# Patient Record
Sex: MALE | Race: BLACK OR AFRICAN AMERICAN | NOT HISPANIC OR LATINO | Employment: UNEMPLOYED | URBAN - METROPOLITAN AREA
[De-identification: names, ages, dates, MRNs, and addresses within clinical notes are randomized per-mention and may not be internally consistent; named-entity substitution may affect disease eponyms.]

---

## 2019-01-31 ENCOUNTER — HOSPITAL ENCOUNTER (EMERGENCY)
Facility: HOSPITAL | Age: 24
Discharge: HOME | End: 2019-01-31
Attending: EMERGENCY MEDICINE
Payer: MEDICAID

## 2019-01-31 VITALS
SYSTOLIC BLOOD PRESSURE: 137 MMHG | OXYGEN SATURATION: 99 % | RESPIRATION RATE: 16 BRPM | WEIGHT: 175 LBS | TEMPERATURE: 98 F | HEIGHT: 68 IN | BODY MASS INDEX: 26.52 KG/M2 | DIASTOLIC BLOOD PRESSURE: 63 MMHG | HEART RATE: 61 BPM

## 2019-01-31 DIAGNOSIS — R05.8 PAINFUL COUGH: ICD-10-CM

## 2019-01-31 DIAGNOSIS — R52 PAINFUL COUGH: ICD-10-CM

## 2019-01-31 DIAGNOSIS — M94.0 COSTOCHONDRITIS: ICD-10-CM

## 2019-01-31 DIAGNOSIS — J02.9 SORE THROAT: ICD-10-CM

## 2019-01-31 DIAGNOSIS — J06.9 URI WITH COUGH AND CONGESTION: Primary | ICD-10-CM

## 2019-01-31 LAB — DEPRECATED S PYO AG THROAT QL EIA: NEGATIVE

## 2019-01-31 PROCEDURE — 87880 STREP A ASSAY W/OPTIC: CPT

## 2019-01-31 PROCEDURE — 87081 CULTURE SCREEN ONLY: CPT

## 2019-01-31 PROCEDURE — 99284 PR EMERGENCY DEPT VISIT,LEVEL IV: ICD-10-PCS | Mod: ,,, | Performed by: PHYSICIAN ASSISTANT

## 2019-01-31 PROCEDURE — 99284 EMERGENCY DEPT VISIT MOD MDM: CPT | Mod: 25

## 2019-01-31 PROCEDURE — 99284 EMERGENCY DEPT VISIT MOD MDM: CPT | Mod: ,,, | Performed by: PHYSICIAN ASSISTANT

## 2019-01-31 RX ORDER — METHYLPREDNISOLONE 4 MG/1
TABLET ORAL
Qty: 1 PACKAGE | Refills: 0 | Status: ON HOLD | OUTPATIENT
Start: 2019-01-31 | End: 2019-05-29 | Stop reason: HOSPADM

## 2019-01-31 RX ORDER — BENZONATATE 100 MG/1
100 CAPSULE ORAL 3 TIMES DAILY PRN
Qty: 20 CAPSULE | Refills: 0 | Status: SHIPPED | OUTPATIENT
Start: 2019-01-31 | End: 2019-02-10

## 2019-01-31 NOTE — ED PROVIDER NOTES
"Encounter Date: 1/31/2019       History     Chief Complaint   Patient presents with    Sore Throat     Pt c/o sore throat and "funny smell in my mouth".   pt also c/o right sided chest pain with movment, cough & breathing.     The patient presents to the ER for an emergent evaluation due to cough, congestion, sore throat, and right sided chest pain with deep breaths and coughing. He states that he has a bad taste in his mouth. He states that the symptoms began gradually over the past week. He states that the degree is mild to moderate. He states that the course is constant. He denies any additional symptoms. He denies any pre-arrival treatment.         Review of patient's allergies indicates:  No Known Allergies  Past Medical History:   Diagnosis Date    Anxiety     Asthma     No hosp for asthma    Conversion disorder     Depression     Murmur     Suicide attempt      History reviewed. No pertinent surgical history.  Family History   Problem Relation Age of Onset    Depression Maternal Uncle     Depression Paternal Grandmother         and anxiety    Depression Paternal Aunt         and anxiety    Suicide Cousin     Sudden death Cousin     Sudden death Cousin     Early death Cousin     Heart attacks under age 50 Cousin      Social History     Tobacco Use    Smoking status: Passive Smoke Exposure - Never Smoker    Smokeless tobacco: Never Used   Substance Use Topics    Alcohol use: No    Drug use: No     Review of Systems   Constitutional: Negative for activity change, appetite change, chills and fever.   HENT: Positive for congestion, rhinorrhea and sore throat.    Eyes: Negative for discharge and redness.   Respiratory: Positive for cough. Negative for chest tightness, shortness of breath, wheezing and stridor.    Cardiovascular: Positive for chest pain. Negative for palpitations and leg swelling.   Gastrointestinal: Negative for abdominal pain, diarrhea, nausea and vomiting.   Genitourinary: " Negative for decreased urine volume, difficulty urinating, dysuria, flank pain, frequency and urgency.   Musculoskeletal: Negative for arthralgias and myalgias.   Skin: Negative for rash.   Neurological: Negative for dizziness, syncope, weakness, light-headedness and headaches.   Psychiatric/Behavioral: Negative for confusion.       Physical Exam     Initial Vitals [01/31/19 1616]   BP Pulse Resp Temp SpO2   137/63 61 16 98.4 °F (36.9 °C) 99 %      MAP       --         Physical Exam    Nursing note and vitals reviewed.  Constitutional: He appears well-developed and well-nourished. He is not diaphoretic. No distress.   HENT:   Head: Normocephalic.   Mouth/Throat: Oropharynx is clear and moist. No oropharyngeal exudate.   Mild nasal congestion. Minimal oropharyngeal erythema w/o swelling, abscess, or exudate. No adenopathy. No hoarseness.    Eyes: Conjunctivae are normal. Right eye exhibits no discharge. Left eye exhibits no discharge.   Neck: Normal range of motion.   Non-tender. No nuchal rigidity.    Cardiovascular: Normal rate and intact distal pulses.   Pulmonary/Chest: Breath sounds normal. No respiratory distress.   Pain to right ribs with deep breaths. Occasional cough.    Abdominal: Soft. He exhibits no distension. There is no tenderness. There is no rebound and no guarding.   Musculoskeletal: Normal range of motion. He exhibits no edema or tenderness.   No lower leg edema. No calf pain or swelling.    Lymphadenopathy:     He has no cervical adenopathy.   Neurological: He is alert and oriented to person, place, and time. He has normal strength. No sensory deficit.   Skin: Skin is warm and dry. No rash noted.   Psychiatric: He has a normal mood and affect. His behavior is normal.         ED Course   Procedures  Labs Reviewed   THROAT SCREEN, RAPID   CULTURE, STREP A,  THROAT     Results for orders placed or performed during the hospital encounter of 01/31/19   Rapid strep screen   Result Value Ref Range     Rapid Strep A Screen Negative Negative            Imaging Results          X-Ray Chest PA And Lateral (Final result)  Result time 01/31/19 17:02:02    Final result by Uche España MD (01/31/19 17:02:02)                 Impression:      No acute abnormality.      Electronically signed by: Uche España MD  Date:    01/31/2019  Time:    17:02             Narrative:    EXAMINATION:  XR CHEST PA AND LATERAL    CLINICAL HISTORY:  Cough    TECHNIQUE:  PA and lateral views of the chest were performed.    COMPARISON:  None    FINDINGS:  The lungs are clear, with normal appearance of pulmonary vasculature and no pleural effusion or pneumothorax.    The cardiac silhouette is normal in size. The hilar and mediastinal contours are unremarkable.    Bones are intact.                                 Medical Decision Making:   Initial Assessment:   Cough, congestion, sore throat, pain with deep breaths   Differential Diagnosis:   URI, viral syndrome, influenza, Pneumonia, Pleurisy, Pleural effusion, Pneumothorax, costochondritis, Strep, Mono, Viral pharyngitis, etc   Clinical Tests:   Lab Tests: Ordered and Reviewed  Radiological Study: Ordered and Reviewed  ED Management:  Strep and CXR negative   Rx for Medrol and Tessalon provided   Advised Motrin as directed as needed   Advised close follow up with primary care   Advised prompt return to the ER if worse in any way or if unimproved.                       Clinical Impression:   The primary encounter diagnosis was URI with cough and congestion. Diagnoses of Painful cough, Sore throat, and Costochondritis were also pertinent to this visit.      Disposition:   Disposition: Discharged  Condition: Stable                          Wellington Luke PA-C  01/31/19 0635

## 2019-01-31 NOTE — ED NOTES
Patient identifiers verified and correct for Tony.     LOC: The patient is awake, alert and aware of environment with an appropriate affect, the patient is oriented x 3 and speaking appropriately.   APPEARANCE: Patient appears comfortable and in no acute distress, patient is clean and well groomed.  SKIN: The skin is warm and dry, color consistent with ethnicity, patient has normal skin turgor and moist mucus membranes, skin intact, no breakdown or bruising noted.   MUSCULOSKELETAL: Patient moving all extremities spontaneously, no swelling noted. Chest wall pain with cough and sore throat.   RESPIRATORY: Airway is open and patent, respirations are spontaneous, patient has a normal effort and rate, no accessory muscle use noted.  CARDIAC: Patient has a normal rate and regular rhythm, no edema noted, capillary refill < 3 seconds.   GASTRO: Pt denies any GI symptoms   : Pt denies any pain or frequency with urination.  NEURO: Pt opens eyes spontaneously, behavior appropriate to situation, follows commands, facial expression symmetrical, bilateral hand grasp equal and even, purposeful motor response noted, normal sensation in all extremities when touched with a finger.

## 2019-02-02 LAB — BACTERIA THROAT CULT: NORMAL

## 2019-05-23 ENCOUNTER — HOSPITAL ENCOUNTER (EMERGENCY)
Facility: HOSPITAL | Age: 24
Discharge: PSYCHIATRIC OR SUBSTANCE USE FACILITY | End: 2019-05-24
Attending: EMERGENCY MEDICINE
Payer: MEDICAID

## 2019-05-23 DIAGNOSIS — R45.851 SUICIDAL IDEATION: Primary | ICD-10-CM

## 2019-05-23 DIAGNOSIS — F32.A DEPRESSION, UNSPECIFIED DEPRESSION TYPE: ICD-10-CM

## 2019-05-23 PROCEDURE — 99284 PR EMERGENCY DEPT VISIT,LEVEL IV: ICD-10-PCS | Mod: ,,, | Performed by: EMERGENCY MEDICINE

## 2019-05-23 PROCEDURE — 99284 EMERGENCY DEPT VISIT MOD MDM: CPT | Mod: ,,, | Performed by: EMERGENCY MEDICINE

## 2019-05-23 PROCEDURE — 99285 EMERGENCY DEPT VISIT HI MDM: CPT

## 2019-05-24 VITALS
BODY MASS INDEX: 26.52 KG/M2 | OXYGEN SATURATION: 100 % | SYSTOLIC BLOOD PRESSURE: 137 MMHG | RESPIRATION RATE: 16 BRPM | HEIGHT: 68 IN | WEIGHT: 175 LBS | TEMPERATURE: 98 F | DIASTOLIC BLOOD PRESSURE: 65 MMHG | HEART RATE: 63 BPM

## 2019-05-24 PROBLEM — J45.909 ASTHMA: Chronic | Status: ACTIVE | Noted: 2019-05-24

## 2019-05-24 PROBLEM — F32.9 MAJOR DEPRESSION: Status: ACTIVE | Noted: 2019-05-24

## 2019-05-24 LAB
ALBUMIN SERPL BCP-MCNC: 4.4 G/DL (ref 3.5–5.2)
ALP SERPL-CCNC: 72 U/L (ref 55–135)
ALT SERPL W/O P-5'-P-CCNC: 21 U/L (ref 10–44)
AMPHET+METHAMPHET UR QL: NEGATIVE
ANION GAP SERPL CALC-SCNC: 11 MMOL/L (ref 8–16)
APAP SERPL-MCNC: <3 UG/ML (ref 10–20)
AST SERPL-CCNC: 19 U/L (ref 10–40)
BARBITURATES UR QL SCN>200 NG/ML: NEGATIVE
BASOPHILS # BLD AUTO: 0.05 K/UL (ref 0–0.2)
BASOPHILS NFR BLD: 0.8 % (ref 0–1.9)
BENZODIAZ UR QL SCN>200 NG/ML: NEGATIVE
BILIRUB SERPL-MCNC: 0.3 MG/DL (ref 0.1–1)
BILIRUB UR QL STRIP: NEGATIVE
BUN SERPL-MCNC: 17 MG/DL (ref 6–20)
BZE UR QL SCN: NEGATIVE
CALCIUM SERPL-MCNC: 9.7 MG/DL (ref 8.7–10.5)
CANNABINOIDS UR QL SCN: NEGATIVE
CHLORIDE SERPL-SCNC: 104 MMOL/L (ref 95–110)
CLARITY UR REFRACT.AUTO: CLEAR
CO2 SERPL-SCNC: 22 MMOL/L (ref 23–29)
COLOR UR AUTO: YELLOW
CREAT SERPL-MCNC: 1.1 MG/DL (ref 0.5–1.4)
CREAT UR-MCNC: 267 MG/DL (ref 23–375)
DIFFERENTIAL METHOD: NORMAL
EOSINOPHIL # BLD AUTO: 0.2 K/UL (ref 0–0.5)
EOSINOPHIL NFR BLD: 2.9 % (ref 0–8)
ERYTHROCYTE [DISTWIDTH] IN BLOOD BY AUTOMATED COUNT: 12.9 % (ref 11.5–14.5)
EST. GFR  (AFRICAN AMERICAN): >60 ML/MIN/1.73 M^2
EST. GFR  (NON AFRICAN AMERICAN): >60 ML/MIN/1.73 M^2
ETHANOL SERPL-MCNC: <10 MG/DL
GLUCOSE SERPL-MCNC: 84 MG/DL (ref 70–110)
GLUCOSE UR QL STRIP: NEGATIVE
HCT VFR BLD AUTO: 45.4 % (ref 40–54)
HGB BLD-MCNC: 14.8 G/DL (ref 14–18)
HGB UR QL STRIP: NEGATIVE
IMM GRANULOCYTES # BLD AUTO: 0.02 K/UL (ref 0–0.04)
IMM GRANULOCYTES NFR BLD AUTO: 0.3 % (ref 0–0.5)
KETONES UR QL STRIP: ABNORMAL
LEUKOCYTE ESTERASE UR QL STRIP: NEGATIVE
LYMPHOCYTES # BLD AUTO: 2.9 K/UL (ref 1–4.8)
LYMPHOCYTES NFR BLD: 45 % (ref 18–48)
MCH RBC QN AUTO: 29.4 PG (ref 27–31)
MCHC RBC AUTO-ENTMCNC: 32.6 G/DL (ref 32–36)
MCV RBC AUTO: 90 FL (ref 82–98)
METHADONE UR QL SCN>300 NG/ML: NEGATIVE
MONOCYTES # BLD AUTO: 0.4 K/UL (ref 0.3–1)
MONOCYTES NFR BLD: 6.5 % (ref 4–15)
NEUTROPHILS # BLD AUTO: 2.9 K/UL (ref 1.8–7.7)
NEUTROPHILS NFR BLD: 44.5 % (ref 38–73)
NITRITE UR QL STRIP: NEGATIVE
NRBC BLD-RTO: 0 /100 WBC
OPIATES UR QL SCN: NEGATIVE
PCP UR QL SCN>25 NG/ML: NEGATIVE
PH UR STRIP: 5 [PH] (ref 5–8)
PLATELET # BLD AUTO: 220 K/UL (ref 150–350)
PMV BLD AUTO: 11.9 FL (ref 9.2–12.9)
POTASSIUM SERPL-SCNC: 3.8 MMOL/L (ref 3.5–5.1)
PROT SERPL-MCNC: 7.8 G/DL (ref 6–8.4)
PROT UR QL STRIP: NEGATIVE
RBC # BLD AUTO: 5.03 M/UL (ref 4.6–6.2)
SALICYLATES SERPL-MCNC: <5 MG/DL (ref 15–30)
SODIUM SERPL-SCNC: 137 MMOL/L (ref 136–145)
SP GR UR STRIP: 1.02 (ref 1–1.03)
TOXICOLOGY INFORMATION: NORMAL
TSH SERPL DL<=0.005 MIU/L-ACNC: 0.99 UIU/ML (ref 0.4–4)
URN SPEC COLLECT METH UR: ABNORMAL
WBC # BLD AUTO: 6.47 K/UL (ref 3.9–12.7)

## 2019-05-24 PROCEDURE — 80320 DRUG SCREEN QUANTALCOHOLS: CPT

## 2019-05-24 PROCEDURE — 80329 ANALGESICS NON-OPIOID 1 OR 2: CPT

## 2019-05-24 PROCEDURE — 80053 COMPREHEN METABOLIC PANEL: CPT

## 2019-05-24 PROCEDURE — 80307 DRUG TEST PRSMV CHEM ANLYZR: CPT

## 2019-05-24 PROCEDURE — 84443 ASSAY THYROID STIM HORMONE: CPT

## 2019-05-24 PROCEDURE — 85025 COMPLETE CBC W/AUTO DIFF WBC: CPT

## 2019-05-24 PROCEDURE — 81003 URINALYSIS AUTO W/O SCOPE: CPT | Mod: 59

## 2019-05-24 NOTE — ED NOTES
Faxed packet to [facility], [facility], [facility], [facility].

## 2019-05-24 NOTE — ED PROVIDER NOTES
"Encounter Date: 5/23/2019    SCRIBE #1 NOTE: I, Dahlia Monae, am scribing for, and in the presence of,  Dr. Villavicencio. I have scribed the entire note.       History     Chief Complaint   Patient presents with    Suicide Attempt     Pt brought in by mother. Per mother pt attempted to hang himself. Per mother pt wrote suicide note and pt was found by brother and "cut down." Pt has flat effect      Time patient was seen by the provider: 11:59 PM      The patient is a 23 y.o. male with co-morbidities including: conversion disorder, suicide attempt, depression, anxiety, and asthma, who presents to the ED with a complaint of suicide attempt earlier this evening. Patient's mother reports he tried to hang himself but was found by his brother who "cut him down". Patient endorses feeling depressed and suicidal ideation. This is not his first suicide attempt. He states something triggered this attempt but he did not explain further. Per his mother he used to take medication for his depression and see a counselor but he stopped both a while ago. Patient denies neck pain or trouble swallowing. He reports a history of psychiatric hospitalization but the last time was in 2015.     The history is provided by the patient.     Review of patient's allergies indicates:  No Known Allergies  Past Medical History:   Diagnosis Date    Anxiety     Asthma     No hosp for asthma    Conversion disorder     Depression     Murmur     Suicide attempt      History reviewed. No pertinent surgical history.  Family History   Problem Relation Age of Onset    Depression Maternal Uncle     Depression Paternal Grandmother         and anxiety    Depression Paternal Aunt         and anxiety    Suicide Cousin     Sudden death Cousin     Sudden death Cousin     Early death Cousin     Heart attacks under age 50 Cousin      Social History     Tobacco Use    Smoking status: Passive Smoke Exposure - Never Smoker    Smokeless tobacco: Never Used "   Substance Use Topics    Alcohol use: No    Drug use: No     Review of Systems   Constitutional: Negative for fever.   HENT: Negative for trouble swallowing.    Eyes: Negative for visual disturbance.   Respiratory: Negative for shortness of breath.    Cardiovascular: Negative for chest pain.   Gastrointestinal: Negative for nausea.   Genitourinary: Negative for dysuria.   Musculoskeletal: Negative for neck pain.   Skin: Negative for rash.   Neurological: Negative for weakness.   Psychiatric/Behavioral: Positive for suicidal ideas.        Positive for suicide attempt and depression.       Physical Exam     Initial Vitals [05/23/19 2333]   BP Pulse Resp Temp SpO2   138/80 (!) 51 16 97.9 °F (36.6 °C) 99 %      MAP       --         Physical Exam    Nursing note and vitals reviewed.  Constitutional: He appears well-developed and well-nourished. No distress.   HENT:   Head: Normocephalic and atraumatic.   Eyes: EOM are normal. Pupils are equal, round, and reactive to light.   Neck: Normal range of motion. Neck supple.   Cardiovascular: Normal rate, regular rhythm, normal heart sounds and intact distal pulses. Exam reveals no gallop and no friction rub.    No murmur heard.  Pulmonary/Chest: Breath sounds normal. No respiratory distress. He has no wheezes. He has no rhonchi. He has no rales.   Abdominal: Soft. He exhibits no distension. There is no tenderness. There is no rebound and no guarding.   Musculoskeletal: Normal range of motion.   Neurological: He is alert and oriented to person, place, and time. He has normal strength. No cranial nerve deficit or sensory deficit.   Skin: Skin is warm and dry. No rash noted.   Psychiatric:   Patient depressed with suicidal ideation.          ED Course   Procedures  Labs Reviewed   COMPREHENSIVE METABOLIC PANEL - Abnormal; Notable for the following components:       Result Value    CO2 22 (*)     All other components within normal limits   URINALYSIS, REFLEX TO URINE CULTURE -  Abnormal; Notable for the following components:    Ketones, UA Trace (*)     All other components within normal limits    Narrative:     Preferred Collection Type->Urine, Clean Catch   ACETAMINOPHEN LEVEL - Abnormal; Notable for the following components:    Acetaminophen (Tylenol), Serum <3.0 (*)     All other components within normal limits   SALICYLATE LEVEL - Abnormal; Notable for the following components:    Salicylate Lvl <5.0 (*)     All other components within normal limits   CBC W/ AUTO DIFFERENTIAL   TSH   DRUG SCREEN PANEL, URINE EMERGENCY    Narrative:     Preferred Collection Type->Urine, Clean Catch   ALCOHOL,MEDICAL (ETHANOL)          Imaging Results    None          Medical Decision Making:   History:   Old Medical Records: I decided to obtain old medical records.  Initial Assessment:   Patient with a history of depression and suicide attempt, presents with suicidal ideation and attempt earlier tonight. Will involuntarily commit and transfer to psychiatric facility.   Differential Diagnosis:   My initial differential diagnoses include but are not limited to: depression, suicidal ideation, hypothyroidism, substance abuse.   Clinical Tests:   Lab Tests: Ordered and Reviewed            Scribe Attestation:   Scribe #1: I performed the above scribed service and the documentation accurately describes the services I performed. I attest to the accuracy of the note.    Attending Attestation:             Attending ED Notes:   Patient's laboratories are unremarkable. He is medically clear. Patient involuntarily committed will transfer to psychiatric facility.              Clinical Impression:       ICD-10-CM ICD-9-CM   1. Suicidal ideation R45.851 V62.84   2. Depression, unspecified depression type F32.9 311         Disposition:   Disposition: Transferred       I, Dr. Deondre Villavicencio III, personally performed the services described in this documentation. All medical record entries made by the scribe were at my  direction and in my presence.  I have reviewed the chart and agree that the record reflects my personal performance and is accurate and complete. Deondre Villavicencio III, MD.  3:30 AM 05/24/2019                   Deondre Villavicencio III, MD  05/24/19 5451

## 2019-05-24 NOTE — ED NOTES
Pt placed in chair in lobby where both this RN and triage tech can maintain visual contact and monitor per code watch protocol. Pt calm and cooperative at this time. NAD. Will continue to monitor.

## 2019-05-24 NOTE — ED NOTES
Patient accepted by [facility] at [facility] ([address], [place], [place]) for the service of Dr. Ocasio.  Report to be called to [phone].

## 2019-05-24 NOTE — ED NOTES
Pt unable to urinate at this time, pitcher of water provided. Instruct to call when ready for specimen.

## 2019-05-24 NOTE — ED TRIAGE NOTES
"Tony Ramires, a 23 y.o. male presents to the ED w/ complaint of suicide attempt yesterday (5/24/19) via hanging himself. Per mother patient wrote a suicide note, and was found by brother and was "cut down". Pt states that the cause of this was because his mother has been " restricting him". Pt has had this problem for years, and has been depressed ever since. Pt has had no previous attempts in the past, and this is the first attempt per patient. Pt states that he has had no alcohol or any drugs in his system today. Pt has a history of depression and anxiety and is not medicated at this time.    Triage note:  Chief Complaint   Patient presents with    Suicide Attempt     Pt brought in by mother. Per mother pt attempted to hang himself. Per mother pt wrote suicide note and pt was found by brother and "cut down." Pt has flat effect      Review of patient's allergies indicates:  No Known Allergies  Past Medical History:   Diagnosis Date    Anxiety     Asthma     No hosp for asthma    Conversion disorder     Depression     Murmur     Suicide attempt      Adult Physical Assessment  LOC: Tony Ramires, 23 y.o. male verified via two identifiers.  The patient is awake, alert, oriented and speaking appropriately at this time.  APPEARANCE: Patient resting comfortably and appears to be in no acute distress at this time. Patient is clean and well groomed, patient's clothing is properly fastened.  SKIN:The skin is warm and dry, color consistent with ethnicity, patient has normal skin turgor and moist mucus membranes, skin intact, no breakdown or brusing noted.  MUSCULOSKELETAL: Patient moving all extremities well, no obvious swelling or deformities noted.  RESPIRATORY: Airway is open and patent, respirations are spontaneous, patient has a normal effort and rate, no accessory muscle use noted.  CARDIAC: Patient has a normal rate and rhythm, no periphreal edema noted in any extremity, capillary refill < 3 seconds in all " extremities  ABDOMEN: Soft and non tender to palpation, no abdominal distention noted.   NEUROLOGIC: Eyes open spontaneously, behavior appropriate to situation, follows commands, facial expression symmetrical, bilateral hand grasp equal and even, purposeful motor response noted, normal sensation in all extremities when touched with a finger.

## 2019-05-24 NOTE — ED NOTES
Bed: [facility] 01  Expected date: 5/23/19  Expected time: 5:22 PM  Means of arrival:   Comments:

## 2019-12-12 ENCOUNTER — HOSPITAL ENCOUNTER (EMERGENCY)
Facility: HOSPITAL | Age: 24
Discharge: HOME | End: 2019-12-12
Attending: EMERGENCY MEDICINE

## 2019-12-12 VITALS
SYSTOLIC BLOOD PRESSURE: 119 MMHG | RESPIRATION RATE: 16 BRPM | HEIGHT: 68 IN | WEIGHT: 175 LBS | OXYGEN SATURATION: 99 % | HEART RATE: 99 BPM | TEMPERATURE: 100 F | DIASTOLIC BLOOD PRESSURE: 62 MMHG | BODY MASS INDEX: 26.52 KG/M2

## 2019-12-12 DIAGNOSIS — M79.10 MYALGIA: ICD-10-CM

## 2019-12-12 DIAGNOSIS — R52 BODY ACHES: ICD-10-CM

## 2019-12-12 DIAGNOSIS — B34.9 VIRAL SYNDROME: Primary | ICD-10-CM

## 2019-12-12 DIAGNOSIS — R10.13 EPIGASTRIC ABDOMINAL PAIN: ICD-10-CM

## 2019-12-12 DIAGNOSIS — R50.9 FEVER, UNSPECIFIED FEVER CAUSE: ICD-10-CM

## 2019-12-12 LAB
ALBUMIN SERPL BCP-MCNC: 4.3 G/DL (ref 3.5–5.2)
ALP SERPL-CCNC: 68 U/L (ref 55–135)
ALT SERPL W/O P-5'-P-CCNC: 27 U/L (ref 10–44)
ANION GAP SERPL CALC-SCNC: 8 MMOL/L (ref 8–16)
AST SERPL-CCNC: 18 U/L (ref 10–40)
BASOPHILS # BLD AUTO: 0.01 K/UL (ref 0–0.2)
BASOPHILS NFR BLD: 0.1 % (ref 0–1.9)
BILIRUB SERPL-MCNC: 1.1 MG/DL (ref 0.1–1)
BILIRUB UR QL STRIP: NEGATIVE
BUN SERPL-MCNC: 18 MG/DL (ref 6–20)
CALCIUM SERPL-MCNC: 9.4 MG/DL (ref 8.7–10.5)
CHLORIDE SERPL-SCNC: 102 MMOL/L (ref 95–110)
CLARITY UR REFRACT.AUTO: ABNORMAL
CO2 SERPL-SCNC: 26 MMOL/L (ref 23–29)
COLOR UR AUTO: YELLOW
CREAT SERPL-MCNC: 1.2 MG/DL (ref 0.5–1.4)
CTP QC/QA: YES
DIFFERENTIAL METHOD: ABNORMAL
EOSINOPHIL # BLD AUTO: 0.1 K/UL (ref 0–0.5)
EOSINOPHIL NFR BLD: 0.5 % (ref 0–8)
ERYTHROCYTE [DISTWIDTH] IN BLOOD BY AUTOMATED COUNT: 13.2 % (ref 11.5–14.5)
EST. GFR  (AFRICAN AMERICAN): >60 ML/MIN/1.73 M^2
EST. GFR  (NON AFRICAN AMERICAN): >60 ML/MIN/1.73 M^2
GLUCOSE SERPL-MCNC: 118 MG/DL (ref 70–110)
GLUCOSE UR QL STRIP: NEGATIVE
HCT VFR BLD AUTO: 46.3 % (ref 40–54)
HGB BLD-MCNC: 15.1 G/DL (ref 14–18)
HGB UR QL STRIP: NEGATIVE
IMM GRANULOCYTES # BLD AUTO: 0.06 K/UL (ref 0–0.04)
IMM GRANULOCYTES NFR BLD AUTO: 0.5 % (ref 0–0.5)
KETONES UR QL STRIP: NEGATIVE
LACTATE SERPL-SCNC: 1.2 MMOL/L (ref 0.5–2.2)
LEUKOCYTE ESTERASE UR QL STRIP: NEGATIVE
LIPASE SERPL-CCNC: 15 U/L (ref 4–60)
LYMPHOCYTES # BLD AUTO: 0.5 K/UL (ref 1–4.8)
LYMPHOCYTES NFR BLD: 4.8 % (ref 18–48)
MCH RBC QN AUTO: 29.3 PG (ref 27–31)
MCHC RBC AUTO-ENTMCNC: 32.6 G/DL (ref 32–36)
MCV RBC AUTO: 90 FL (ref 82–98)
MONOCYTES # BLD AUTO: 0.7 K/UL (ref 0.3–1)
MONOCYTES NFR BLD: 6 % (ref 4–15)
NEUTROPHILS # BLD AUTO: 9.8 K/UL (ref 1.8–7.7)
NEUTROPHILS NFR BLD: 88.1 % (ref 38–73)
NITRITE UR QL STRIP: NEGATIVE
NRBC BLD-RTO: 0 /100 WBC
PH UR STRIP: 6 [PH] (ref 5–8)
PLATELET # BLD AUTO: 189 K/UL (ref 150–350)
PMV BLD AUTO: 12.1 FL (ref 9.2–12.9)
POC MOLECULAR INFLUENZA A AGN: NEGATIVE
POC MOLECULAR INFLUENZA B AGN: NEGATIVE
POTASSIUM SERPL-SCNC: 4 MMOL/L (ref 3.5–5.1)
PROT SERPL-MCNC: 7.6 G/DL (ref 6–8.4)
PROT UR QL STRIP: NEGATIVE
RBC # BLD AUTO: 5.16 M/UL (ref 4.6–6.2)
SODIUM SERPL-SCNC: 136 MMOL/L (ref 136–145)
SP GR UR STRIP: 1.03 (ref 1–1.03)
URN SPEC COLLECT METH UR: ABNORMAL
WBC # BLD AUTO: 11.17 K/UL (ref 3.9–12.7)

## 2019-12-12 PROCEDURE — 96361 HYDRATE IV INFUSION ADD-ON: CPT

## 2019-12-12 PROCEDURE — 63600175 PHARM REV CODE 636 W HCPCS: Performed by: EMERGENCY MEDICINE

## 2019-12-12 PROCEDURE — 93005 ELECTROCARDIOGRAM TRACING: CPT

## 2019-12-12 PROCEDURE — 81003 URINALYSIS AUTO W/O SCOPE: CPT

## 2019-12-12 PROCEDURE — 80053 COMPREHEN METABOLIC PANEL: CPT

## 2019-12-12 PROCEDURE — 99285 EMERGENCY DEPT VISIT HI MDM: CPT | Mod: ,,, | Performed by: EMERGENCY MEDICINE

## 2019-12-12 PROCEDURE — 93010 ELECTROCARDIOGRAM REPORT: CPT | Mod: ,,, | Performed by: INTERNAL MEDICINE

## 2019-12-12 PROCEDURE — 99285 EMERGENCY DEPT VISIT HI MDM: CPT | Mod: 25

## 2019-12-12 PROCEDURE — 83690 ASSAY OF LIPASE: CPT

## 2019-12-12 PROCEDURE — 85025 COMPLETE CBC W/AUTO DIFF WBC: CPT

## 2019-12-12 PROCEDURE — 96375 TX/PRO/DX INJ NEW DRUG ADDON: CPT

## 2019-12-12 PROCEDURE — 96374 THER/PROPH/DIAG INJ IV PUSH: CPT

## 2019-12-12 PROCEDURE — 87502 INFLUENZA DNA AMP PROBE: CPT

## 2019-12-12 PROCEDURE — 93010 EKG 12-LEAD: ICD-10-PCS | Mod: ,,, | Performed by: INTERNAL MEDICINE

## 2019-12-12 PROCEDURE — 83605 ASSAY OF LACTIC ACID: CPT

## 2019-12-12 PROCEDURE — 25500020 PHARM REV CODE 255: Performed by: EMERGENCY MEDICINE

## 2019-12-12 PROCEDURE — 99285 PR EMERGENCY DEPT VISIT,LEVEL V: ICD-10-PCS | Mod: ,,, | Performed by: EMERGENCY MEDICINE

## 2019-12-12 RX ORDER — ONDANSETRON 4 MG/1
4 TABLET, FILM COATED ORAL EVERY 6 HOURS
Qty: 12 TABLET | Refills: 0 | Status: SHIPPED | OUTPATIENT
Start: 2019-12-12

## 2019-12-12 RX ORDER — HYDROMORPHONE HYDROCHLORIDE 1 MG/ML
1 INJECTION, SOLUTION INTRAMUSCULAR; INTRAVENOUS; SUBCUTANEOUS
Status: COMPLETED | OUTPATIENT
Start: 2019-12-12 | End: 2019-12-12

## 2019-12-12 RX ORDER — KETOROLAC TROMETHAMINE 30 MG/ML
15 INJECTION, SOLUTION INTRAMUSCULAR; INTRAVENOUS
Status: COMPLETED | OUTPATIENT
Start: 2019-12-12 | End: 2019-12-12

## 2019-12-12 RX ORDER — ONDANSETRON 2 MG/ML
4 INJECTION INTRAMUSCULAR; INTRAVENOUS
Status: COMPLETED | OUTPATIENT
Start: 2019-12-12 | End: 2019-12-12

## 2019-12-12 RX ADMIN — ONDANSETRON 4 MG: 2 INJECTION INTRAMUSCULAR; INTRAVENOUS at 05:12

## 2019-12-12 RX ADMIN — KETOROLAC TROMETHAMINE 15 MG: 30 INJECTION, SOLUTION INTRAMUSCULAR; INTRAVENOUS at 05:12

## 2019-12-12 RX ADMIN — IOHEXOL 75 ML: 350 INJECTION, SOLUTION INTRAVENOUS at 09:12

## 2019-12-12 RX ADMIN — SODIUM CHLORIDE 1000 ML: 0.9 INJECTION, SOLUTION INTRAVENOUS at 05:12

## 2019-12-12 RX ADMIN — SODIUM CHLORIDE, SODIUM LACTATE, POTASSIUM CHLORIDE, AND CALCIUM CHLORIDE 1000 ML: .6; .31; .03; .02 INJECTION, SOLUTION INTRAVENOUS at 10:12

## 2019-12-12 RX ADMIN — HYDROMORPHONE HYDROCHLORIDE 1 MG: 1 INJECTION, SOLUTION INTRAMUSCULAR; INTRAVENOUS; SUBCUTANEOUS at 08:12

## 2019-12-12 RX ADMIN — SODIUM CHLORIDE, SODIUM LACTATE, POTASSIUM CHLORIDE, AND CALCIUM CHLORIDE 1000 ML: .6; .31; .03; .02 INJECTION, SOLUTION INTRAVENOUS at 08:12

## 2019-12-12 NOTE — ED NOTES
LOC/ APPEARANCE: The patient is AAOx4. Pt is speaking appropriately, no slurred speech. Pt changed into hospital gown. Continuous cardiac monitor, cont pulse ox, and auto BP cuff applied to patient. Pt is clean and well groomed. No JVD visible. Pt reports pain level of 0. Pt updated on POC. Bed low and locked with side rails up x2, call bell in pt reach.  SKIN: Skin is hot dry and intact, and color is consistent with ethnicity. Capillary refill <3 seconds. No breakdown or brusing visible. Mucus membranes moist, acyanotic.  RESPIRATORY: SOB Airway is open and patent. Respirations-spontaneous, unlabored, regular rate, equal bilaterally on inspiration and expiration. No accessory muscle use noted. Lungs clear to auscultation in all fields bilaterally anterior and posterior.   CARDIAC: N/V Patient has regular heart rate.  No peripheral edema noted, and patient has no c/o chest pain. Peripheral pulses present equal and strong throughout.  ABDOMEN: ABD PAIN Soft and non-tender to palpation with no distention noted. Normoactive bowel sounds x4 quadrants. Pt has no complaints of abnormal bowel movements, denies blood in stool. Pt reports normal appetite.   NEUROLOGIC: Eyes open spontaneously and facial expression symmetrical. Pt behavior appropriate to situation, and pt follows commands. Pt reports sensation present in all extremities when touched with a finger, denies any numbness or tingling. PERRLA  MUSCULOSKELETAL: Spontaneous movement noted to all extremities. Hand  equal and leg strength strong +5 bilaterally.   : No complaints of frequency, burning, urgency or blood in the urine. No complaints of incontinence.

## 2019-12-12 NOTE — ED TRIAGE NOTES
Pt came in PMV c\o Generalized Body Aches (hot and cold started eary mornning, vomited few times, coughing(non productive), fever, N/V x 3 today, generalized abd pain.

## 2019-12-12 NOTE — ED PROVIDER NOTES
Encounter Date: 12/12/2019       History     Chief Complaint   Patient presents with    Generalized Body Aches     hot and cold started eary mornning, vomited few times     HPI   23 Y/O healthy male complains of 12-14 hours of general malaise, myalgias, decreased p.o. intake/anorexia, nausea and 1-2 episodes of nonbloody nonbilious emesis.  Over the last few hours, he has developed nonradiating epigastric versus right upper quadrant abdominal pain with the associated nausea and emesis.  He denies any history cholelithiasis or cholecystitis.  He denies any complaints over the last few weeks and assures no history of peptic ulcer disease, postprandial abdominal or back pain. He denies any diarrhea and assures no abdominal distension or urinary complaints. He has not been sexually active in the last month and denies any high risk sexual encounters with no discharge, dysuria, in urinary frequency, urinary hesitancy or scrotal swelling/pain.  He denies any back pain. Otherwise, he denies any headache, neck pain/stiffness, odynophagia, dysphagia to solids or liquids, sore throat, nasal congestion, cough, dyspnea, dyspnea on exertion.  Otherwise no other complaints.    Review of patient's allergies indicates:  No Known Allergies  Past Medical History:   Diagnosis Date    Anxiety     Asthma     No hosp for asthma    Conversion disorder     Depression     Murmur     Suicide attempt      History reviewed. No pertinent surgical history.  Family History   Problem Relation Age of Onset    Depression Maternal Uncle     Depression Paternal Grandmother         and anxiety    Depression Paternal Aunt         and anxiety    Suicide Cousin     Sudden death Cousin     Sudden death Cousin     Early death Cousin     Heart attacks under age 50 Cousin      Social History     Tobacco Use    Smoking status: Passive Smoke Exposure - Never Smoker    Smokeless tobacco: Never Used   Substance Use Topics    Alcohol use: No     Drug use: No     Review of Systems  CONST: + fever, myalgias and chills, but no weight change, or fatigue.  HEENT: No headache, blurry vision/change in vision, sore throat, ear pain, eye pain, otorrhea, rhinorrhea, tooth pain, swelling, or voice changes.  NECK: No pain or stiffness, no masses, trauma, or redness.  HEART: No pain, palpitations, diaphoresis, nausea, or vomiting  LUNG: No SOB, cough, orthopnea, MATA or other complaints.  ABDOMEN: + pain, nausea, and vomiting, but no diarrhea, constipation, or flank pain  : No discharge, dysuria, lesions, rashes, masses, sores  EXTREMITIES: FROM with No swelling, redness, injuries/trauma, lesions, sores, weakness, numbness, or tingling  NEURO: No dizziness, weakness, fatigue, tremors, headache, change in vision or disturbances of balance or coordination  SKIN: No lesions, rashes, trauma or other complaints    Physical Exam     Initial Vitals [12/12/19 1643]   BP Pulse Resp Temp SpO2   (!) 145/69 (!) 130 20 (!) 102.8 °F (39.3 °C) 98 %      MAP       --         Physical Exam  CONSTITUTIONAL: Non-toxic appearance. Well-developed and well-nourished. Active and cooperative. Sitting on stretcher in no acute distress.  HEAD: NC/AT.   Right EAR: External ear normal. No TTP of Pinna/Tragus. Hearing and external ear  normal. No lacerations. No drainage, swelling or tenderness. No foreign bodies.No  mastoid TTPercussion. No middle ear effusion. No decreased hearing is noted.  Left EAR: External ear normal. No TTP of Pinna/Tragus. Hearing and external ear normal. No lacerations. No drainage, swelling or tenderness. No foreign bodies.No  mastoid TTPercussion. No middle ear effusion. No decreased hearing is noted.  NOSE: + B/L Edematous Mucosal Turbinates with clear non-purulent rhinorrhea. No nose lacerations, nasal deformity, septal deviation or nasal septal hematoma. No epistaxis. No foreign bodies. Right sinus exhibits no maxillary or frontal sinus tenderness to percussion. Left  sinus exhibits no maxillary or frontal sinus tenderness to percussion.  MOUTH/THROAT: Speaking Full Sentences with no drooling, hoarseness/muffled voice.  Oropharynx is clear and moist and mucous membranes are normal. No uvular swelling  or deviation. + Mild posterior oropharyngeal erythema, but No oropharyngeal asymmetry, exudates, posterior oropharyngeal edema, or tonsillar/peritonsilar abscesses. No trismus in the jaw. No sublingual elevation, rigidity or TTP.  EYES: Anicteric. PERRL. Conjunctivae normal and EOM are normal. Right eye exhibits  no chemosis, no discharge and no exudate. Left eye exhibits no chemosis, no discharge  and no exudate. No scleral icterus.  NECK: Supple with Trachea normal, normal range of motion, full passive range of motion without pain and phonation normal. No muscular tenderness present. No rigidity. No tracheal deviation and normal range of motion present.  CARDIOVASCULAR: RRR, normal heart sounds, no murmurs and normal pulses.  PULMONARY/CHEST: No Tachypnea, Effort normal and breath sounds normal. No  accessory muscle usage. No respiratory distress. Lungs CTA B/L with No W/R/R.  ABDOMEN: +BS, Soft. ND. + epigastric abdominal tenderness to palpation with no involuntary guarding; negative Hood sign. Negative Rovsing, psoas or obturator sign.  MUSCULOSKELETAL: FROM.  NEURO: AAOx3. Answering Questions Appropriately.  Cranial nerves intact with no focal neurological deficit.  SKIN: Skin is warm, dry and intact. No rash noted.    ED Course   Procedures  Labs Reviewed   CBC W/ AUTO DIFFERENTIAL - Abnormal; Notable for the following components:       Result Value    Gran # (ANC) 9.8 (*)     Immature Grans (Abs) 0.06 (*)     Lymph # 0.5 (*)     Gran% 88.1 (*)     Lymph% 4.8 (*)     All other components within normal limits   COMPREHENSIVE METABOLIC PANEL - Abnormal; Notable for the following components:    Glucose 118 (*)     Total Bilirubin 1.1 (*)     All other components within normal  limits   URINALYSIS, REFLEX TO URINE CULTURE - Abnormal; Notable for the following components:    Appearance, UA Hazy (*)     All other components within normal limits    Narrative:     Preferred Collection Type->Urine, Clean Catch   LIPASE   LACTIC ACID, PLASMA   POCT INFLUENZA A/B MOLECULAR     EKG Readings: (Independently Interpreted)   Sinus tachycardia at a rate of 117 beats per minute; normal ventricular axis; DE/QRS/QTC intervals within normal limits with appropriate R-wave progression and no STEMI.  ____________________  Jr SHARA Erickson MD, [facility]  Emergency Medicine Staff  5:24 PM 12/12/2019         Imaging Results          CT Abdomen Pelvis With Contrast (Final result)  Result time 12/12/19 22:15:05    Final result by Jose Slaughter MD (12/12/19 22:15:05)                 Impression:      No acute findings in the abdomen or pelvis.      Electronically signed by: Jose Slaughter MD  Date:    12/12/2019  Time:    22:15             Narrative:    EXAMINATION:  CT ABDOMEN PELVIS WITH CONTRAST    CLINICAL HISTORY:  RLQ pain, appendicitis suspected;    TECHNIQUE:  Low dose axial images, sagittal and coronal reformations were obtained from the lung bases to the pubic symphysis following the IV administration of 75 mL of Omnipaque 350 .  Oral contrast was not given.    COMPARISON:  Abdominal ultrasound, same day.    FINDINGS:  Lower Chest:    Lung bases are clear.  Heart size is normal.    Abdomen:    Liver is normal in size and contour.  No focal hepatic lesion.  Gallbladder is unremarkable. No intrahepatic biliary ductal dilatation.    Spleen, adrenals, and pancreas are unremarkable.    The kidneys are symmetric.  No hydronephrosis. Subcentimeter hypodensity in the inferior pole of the right kidney is too small to definitively characterize, but most likely represents a cyst.    No small bowel obstruction.  No inflammatory changes identified involving the gastrointestinal tract.  The appendix is not dilated.    No  pneumoperitoneum or organized fluid collection.    No bulky lymphadenopathy.    Abdominal aorta is normal in caliber.    Portal, splenic, and superior mesenteric veins are patent.    Pelvis:    Urinary bladder is relatively decompressed but otherwise unremarkable.  Rectum and pelvic organs demonstrate no acute abnormality.  No free fluid in the pelvis.  No pelvic lymphadenopathy.    Bones and soft tissues:    No aggressive osseous lesions.                               US Abdomen Limited (Final result)  Result time 12/12/19 18:35:49    Final result by Maxx Dc MD (12/12/19 18:35:49)                 Impression:      No significant sonographic abnormality.      Electronically signed by: Maxx Dc MD  Date:    12/12/2019  Time:    18:35             Narrative:    EXAMINATION:  US ABDOMEN LIMITED    CLINICAL HISTORY:  Abdominal Pain - Gallbladder;    TECHNIQUE:  Limited ultrasound of the right upper quadrant of the abdomen (including pancreas, liver, gallbladder, common bile duct, and spleen) was performed.    COMPARISON:  None.    FINDINGS:  Liver: Visualized hepatic parenchyma demonstrates homogeneous echogenicity without focal lesions.  No focal hepatic lesions.    Gallbladder: No calculi, wall thickening, or pericholecystic fluid.  No sonographic Hood's sign.    Biliary system: The common duct is not dilated, measuring 2 mm.  No intrahepatic ductal dilatation.    Miscellaneous: No upper abdominal ascites.                                 Medical Decision Making:   History:   Old Medical Records: I decided to obtain old medical records.  Initial Assessment:   Fall, tachycardic, and sick appearing male presents with anorexia, nausea and nonbilious nonbloody emesis, generalized malaise and nonradiating epigastric abdominal pain in the last 12 to 14 hr.  He denies any upper respiratory or respiratory complaints. Exam revealed an anicteric and non jaundice male with nonsurgical abdomen.  Will provide  antiemetics and analgesics with both labs and right upper quadrant ultrasound to evaluate for cholelithiasis versus cholecystitis as etiology to his symptoms.  ____________________  Jr Erickson MD, [facility]  Emergency Medicine Staff  5:31 PM 12/12/2019  F/U: Abd Soft, ND, NTTP.  ____________________  Jr Erickson MD, [facility]  Emergency Medicine Staff  7:42 PM 12/12/2019    F/U:  Patient resting comfortably after analgesia.  Abdomen soft nondistended with improved supraumbilical abdominal pain. Awaiting CT results.  ____________________  Jr Erickson MD, [facility]  Emergency Medicine Staff  10:05 PM 12/12/2019    F/U:  No appreciated radiological evidence of appendicitis, cholecystitis or any intra-abdominal process warranting emergent surgical intervention.  Abdomen is completely soft, nondistended nontender to palpation on re-evaluation.  Patient is tolerating p.o..  ____________________  Jr Erickson MD, [facility]  Emergency Medicine Staff  10:36 PM 12/12/2019    STAFF ATTENDING PHYSICIAN F/U NOTE:  Tony Ramires has been evaluated and treated. He reports complete resolution of Sx and is ready to return home. Currently patient reports no further pain, nausea or additional complaints. We discussed use of over-the-counter acetaminophen versus ibuprofen for her fever.  Additionally prescribed him ODT Zofran in case nausea and we discussed Sx warranting immediate ED return, which were acknowledged. I recommended F/U and discussion of visit with primary care physician.  ____________________  Jr Erickson MD, [facility]  Emergency Medicine Staff  10:46 PM 12/12/2019        Clinical Tests:   Lab Tests: Ordered  Radiological Study: Ordered  Medical Tests: Ordered                                 Clinical Impression:       ICD-10-CM ICD-9-CM   1. Viral syndrome B34.9 079.99   2. Epigastric abdominal pain R10.13 789.06   3. Fever, unspecified fever cause R50.9 780.60   4. Myalgia M79.10 729.1   5. Body aches R52 780.96         Disposition:    Disposition: Discharged  Condition: Stable                     Eric Erickson MD  12/13/19 0102

## 2019-12-13 NOTE — DISCHARGE INSTRUCTIONS
CT Abdomen Pelvis With Contrast (Final result)   Result time 12/12/19 22:15:05   Final result by Jose Slaughter MD (12/12/19 22:15:05)                Impression:      No acute findings in the abdomen or pelvis.      Electronically signed by: Jose Slaughter MD  Date: 12/12/2019  Time: 22:15            Narrative:    EXAMINATION:  CT ABDOMEN PELVIS WITH CONTRAST    CLINICAL HISTORY:  RLQ pain, appendicitis suspected;    TECHNIQUE:  Low dose axial images, sagittal and coronal reformations were obtained from the lung bases to the pubic symphysis following the IV administration of 75 mL of Omnipaque 350 .  Oral contrast was not given.    COMPARISON:  Abdominal ultrasound, same day.    FINDINGS:  Lower Chest:    Lung bases are clear.  Heart size is normal.    Abdomen:    Liver is normal in size and contour.  No focal hepatic lesion.  Gallbladder is unremarkable. No intrahepatic biliary ductal dilatation.    Spleen, adrenals, and pancreas are unremarkable.    The kidneys are symmetric.  No hydronephrosis. Subcentimeter hypodensity in the inferior pole of the right kidney is too small to definitively characterize, but most likely represents a cyst.    No small bowel obstruction.  No inflammatory changes identified involving the gastrointestinal tract.  The appendix is not dilated.    No pneumoperitoneum or organized fluid collection.    No bulky lymphadenopathy.    Abdominal aorta is normal in caliber.    Portal, splenic, and superior mesenteric veins are patent.    Pelvis:    Urinary bladder is relatively decompressed but otherwise unremarkable.  Rectum and pelvic organs demonstrate no acute abnormality.  No free fluid in the pelvis.  No pelvic lymphadenopathy.    Bones and soft tissues:    No aggressive osseous lesions.                    US Abdomen Limited (Final result)   Result time 12/12/19 18:35:49   Final result by Maxx Dc MD (12/12/19 18:35:49)                Impression:      No significant  sonographic abnormality.      Electronically signed by: Maxx Dc MD  Date: 12/12/2019  Time: 18:35            Narrative:    EXAMINATION:  US ABDOMEN LIMITED    CLINICAL HISTORY:  Abdominal Pain - Gallbladder;    TECHNIQUE:  Limited ultrasound of the right upper quadrant of the abdomen (including pancreas, liver, gallbladder, common bile duct, and spleen) was performed.    COMPARISON:  None.    FINDINGS:  Liver: Visualized hepatic parenchyma demonstrates homogeneous echogenicity without focal lesions.  No focal hepatic lesions.    Gallbladder: No calculi, wall thickening, or pericholecystic fluid.  No sonographic Hood's sign.    Biliary system: The common duct is not dilated, measuring 2 mm.  No intrahepatic ductal dilatation.    Miscellaneous: No upper abdominal ascites.

## 2019-12-13 NOTE — ED NOTES
Pt resting comfortably and independently repositioned in recliner. NAD noted at this time. Respirations even and unlabored and visible chest rise noted.  Patient offered bathroom assistance and denies need at this time. Pt instructed to call if assistance is needed. \\No needs at this time. Will continue to monitor.

## 2021-01-03 ENCOUNTER — HOSPITAL ENCOUNTER (EMERGENCY)
Facility: HOSPITAL | Age: 26
Discharge: HOME | End: 2021-01-03
Attending: EMERGENCY MEDICINE

## 2021-01-03 VITALS
TEMPERATURE: 99 F | SYSTOLIC BLOOD PRESSURE: 135 MMHG | WEIGHT: 175 LBS | DIASTOLIC BLOOD PRESSURE: 70 MMHG | HEART RATE: 69 BPM | RESPIRATION RATE: 18 BRPM | HEIGHT: 68 IN | BODY MASS INDEX: 26.52 KG/M2 | OXYGEN SATURATION: 97 %

## 2021-01-03 DIAGNOSIS — R07.9 CHEST PAIN: ICD-10-CM

## 2021-01-03 DIAGNOSIS — R10.9 FLANK PAIN: Primary | ICD-10-CM

## 2021-01-03 PROCEDURE — 93010 ELECTROCARDIOGRAM REPORT: CPT | Mod: ,,, | Performed by: INTERNAL MEDICINE

## 2021-01-03 PROCEDURE — 93005 ELECTROCARDIOGRAM TRACING: CPT

## 2021-01-03 PROCEDURE — 99284 EMERGENCY DEPT VISIT MOD MDM: CPT | Mod: ,,, | Performed by: PHYSICIAN ASSISTANT

## 2021-01-03 PROCEDURE — 99283 EMERGENCY DEPT VISIT LOW MDM: CPT | Mod: 25

## 2021-01-03 PROCEDURE — 25000003 PHARM REV CODE 250: Performed by: PHYSICIAN ASSISTANT

## 2021-01-03 PROCEDURE — 99284 PR EMERGENCY DEPT VISIT,LEVEL IV: ICD-10-PCS | Mod: ,,, | Performed by: PHYSICIAN ASSISTANT

## 2021-01-03 PROCEDURE — 93010 EKG 12-LEAD: ICD-10-PCS | Mod: ,,, | Performed by: INTERNAL MEDICINE

## 2021-01-03 RX ORDER — ACETAMINOPHEN 500 MG
1000 TABLET ORAL
Status: COMPLETED | OUTPATIENT
Start: 2021-01-03 | End: 2021-01-03

## 2021-01-03 RX ORDER — IBUPROFEN 600 MG/1
600 TABLET ORAL
Status: COMPLETED | OUTPATIENT
Start: 2021-01-03 | End: 2021-01-03

## 2021-01-03 RX ORDER — NAPROXEN 500 MG/1
500 TABLET ORAL 2 TIMES DAILY WITH MEALS
Qty: 30 TABLET | Refills: 0 | Status: SHIPPED | OUTPATIENT
Start: 2021-01-03

## 2021-01-03 RX ADMIN — ACETAMINOPHEN 1000 MG: 500 TABLET ORAL at 04:01

## 2021-01-03 RX ADMIN — IBUPROFEN 600 MG: 600 TABLET, FILM COATED ORAL at 04:01
